# Patient Record
Sex: FEMALE | URBAN - METROPOLITAN AREA
[De-identification: names, ages, dates, MRNs, and addresses within clinical notes are randomized per-mention and may not be internally consistent; named-entity substitution may affect disease eponyms.]

---

## 2017-08-19 ENCOUNTER — NURSE TRIAGE (OUTPATIENT)
Dept: ADMINISTRATIVE | Facility: CLINIC | Age: 22
End: 2017-08-19

## 2017-08-19 NOTE — TELEPHONE ENCOUNTER
"    Reason for Disposition   Wants a pregnancy test done in the office    Answer Assessment - Initial Assessment Questions  1. LMP:  "When did your last menstrual period begin?"     Pt started on 7/7 lasted for four days. Started BC patch on 7/12- gave rash and took it off couple days later. Sees alex in [place]. Started 7/20 after removing patch.   2. DAYS LATE: "How many days late is your menstrual period?"     3-15 days late   3. REGULARITY: "How regular are your periods?"     Last two to three days feeling sick./ has twin 9 month olds.   4. PREGNANCY: "Is there any chance you are pregnant?" (e.g., unprotected intercourse, missed birth control pill, broken condom)    3-15 days late.   5. BREASTFEEDING: "Are you breastfeeding?"     No   6. BIRTH CONTROL PILLS: "Are you taking birth control pills, or have you stopped recently?"     No   7. DEPOPROVERA: "Has your doctor given you a shot to prevent pregnancy?" (e.g., Depoprovera injection)     No   8. CAUSE: "What do you think caused the missed period?" (e.g., stress, rapid weight loss, excessive exercise)     No   9. OTHER SYMPTOMS: "Do you have any other symptoms?" (e.g., abdominal pain)    No    Protocols used: ST MENSTRUAL PERIOD - MISSED OR LATE-A-AH  miscarriage back in march.   rec UPT. rec to follow up with MD on Monday. Call back with questions.     "